# Patient Record
Sex: MALE | Race: WHITE | NOT HISPANIC OR LATINO | Employment: STUDENT | URBAN - METROPOLITAN AREA
[De-identification: names, ages, dates, MRNs, and addresses within clinical notes are randomized per-mention and may not be internally consistent; named-entity substitution may affect disease eponyms.]

---
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
## 2025-03-07 ENCOUNTER — OFFICE VISIT (OUTPATIENT)
Dept: URGENT CARE | Facility: CLINIC | Age: 20
End: 2025-03-07
Payer: OTHER GOVERNMENT

## 2025-03-07 VITALS
DIASTOLIC BLOOD PRESSURE: 74 MMHG | SYSTOLIC BLOOD PRESSURE: 110 MMHG | OXYGEN SATURATION: 98 % | RESPIRATION RATE: 17 BRPM | BODY MASS INDEX: 18.77 KG/M2 | WEIGHT: 138.56 LBS | HEART RATE: 117 BPM | TEMPERATURE: 99 F | HEIGHT: 72 IN

## 2025-03-07 DIAGNOSIS — R50.9 FEVER, UNSPECIFIED FEVER CAUSE: Primary | ICD-10-CM

## 2025-03-07 DIAGNOSIS — R05.1 ACUTE COUGH: ICD-10-CM

## 2025-03-07 DIAGNOSIS — J10.1 INFLUENZA A: ICD-10-CM

## 2025-03-07 DIAGNOSIS — R09.81 SINUS CONGESTION: ICD-10-CM

## 2025-03-07 LAB
CTP QC/QA: YES
POC MOLECULAR INFLUENZA A AGN: POSITIVE
POC MOLECULAR INFLUENZA B AGN: NEGATIVE

## 2025-03-07 RX ORDER — OSELTAMIVIR PHOSPHATE 75 MG/1
75 CAPSULE ORAL 2 TIMES DAILY
Qty: 10 CAPSULE | Refills: 0 | Status: SHIPPED | OUTPATIENT
Start: 2025-03-07 | End: 2025-03-12

## 2025-03-07 NOTE — PROGRESS NOTES
Patient ID: Jason Alvares is a 19 y.o. male.  Chief Complaint: Sore Throat    HPI:   Patient presents here today for above reason.     Patient is a 19 y.o. male who presents to urgent care with complaints of HA, Fever this morning, intermittent dry cough, sinus congestion, sore throat  x 1-2 day onset. (-) Alleviating factors. Patient denies chest congestion, SOB upon exertion, N/V/D.  Confirms positive flu exposure, therefore, requesting to be tested.     Past Medical History:  History reviewed. No pertinent past medical history.  History reviewed. No pertinent surgical history.  Review of patient's allergies indicates:   Allergen Reactions    Omnicef [cefdinir] Rash    Sulfa (sulfonamide antibiotics) Rash     Current Outpatient Medications   Medication Instructions    doxycycline (MONODOX) 100 mg, Oral, 2 times daily    ketoconazole (NIZORAL) 2 % shampoo Apply topically.    oseltamivir (TAMIFLU) 75 mg, Oral, 2 times daily     Social History[1]    ROS:   Review of Systems  12 point review of systems conducted, negative except as stated in the history of present illness. See HPI for details.   Vitals/PE:   Visit Vitals  /74 (Patient Position: Sitting)   Pulse (!) 117   Temp 98.8 °F (37.1 °C)   Resp 17   Ht 6' (1.829 m)   Wt 62.9 kg (138 lb 9 oz)   SpO2 98%   BMI 18.79 kg/m²     Physical Exam  Vitals and nursing note reviewed.   Constitutional:       Appearance: He is ill-appearing. He is not toxic-appearing or diaphoretic.   HENT:      Head: Normocephalic.      Nose: Mucosal edema and congestion present.      Right Turbinates: Enlarged and swollen.      Left Turbinates: Enlarged and swollen.      Right Sinus: Maxillary sinus tenderness and frontal sinus tenderness present.      Left Sinus: Maxillary sinus tenderness and frontal sinus tenderness present.      Mouth/Throat:      Pharynx: Posterior oropharyngeal erythema present.   Eyes:      Pupils: Pupils are equal, round, and reactive to light.    Cardiovascular:      Rate and Rhythm: Normal rate.      Pulses: Normal pulses.   Pulmonary:      Effort: Pulmonary effort is normal.   Abdominal:      General: Abdomen is flat.   Musculoskeletal:         General: Normal range of motion.   Skin:     General: Skin is warm.      Capillary Refill: Capillary refill takes less than 2 seconds.   Neurological:      General: No focal deficit present.      Mental Status: He is alert and oriented to person, place, and time.   Psychiatric:         Mood and Affect: Mood normal.         Behavior: Behavior normal.         Results for orders placed or performed in visit on 03/07/25   POCT Influenza A/B Molecular    Collection Time: 03/07/25  4:32 PM   Result Value Ref Range    POC Molecular Influenza A Ag Positive (A) Negative    POC Molecular Influenza B Ag Negative Negative     Acceptable Yes      Assessment/Plan:   Fever, unspecified fever cause  -     POCT Influenza A/B Molecular  Positive  Acute cough  Okay to utilize OTC cough and cold medications as needed.  Sinus congestion  As above  Influenza A  -     oseltamivir (TAMIFLU) 75 MG capsule; Take 1 capsule (75 mg total) by mouth 2 (two) times daily. for 5 days  Dispense: 10 capsule; Refill: 0  See educational handouts pertaining to influenza infections.     Orders Placed This Encounter   Procedures    POCT Influenza A/B Molecular       Education and counseling done face to face regarding medical conditions and plan. Contact office if new symptoms develop. Should any symptoms ever significantly worsen seek emergency medical attention/go to ER. Follow up at least yearly for wellness or sooner PRN. Nurse to call patient with any results. The patient is receptive, expresses understanding and is agreeable to plan. All questions have been answered.             [1]   Social History  Socioeconomic History    Marital status: Single   Tobacco Use    Smoking status: Never    Smokeless tobacco: Never   Substance and  Sexual Activity    Alcohol use: No    Drug use: No    Sexual activity: Never   Social History Narrative    Lives at home with both parents, no smoking, no pets

## 2025-03-07 NOTE — PATIENT INSTRUCTIONS
Assessment/Plan:   Fever, unspecified fever cause  -     POCT Influenza A/B Molecular  Positive  Acute cough  Okay to utilize OTC cough and cold medications as needed.  Sinus congestion  As above  Influenza A  -     oseltamivir (TAMIFLU) 75 MG capsule; Take 1 capsule (75 mg total) by mouth 2 (two) times daily. for 5 days  Dispense: 10 capsule; Refill: 0  See educational handouts pertaining to influenza infections.     Orders Placed This Encounter   Procedures    POCT Influenza A/B Molecular       Education and counseling done face to face regarding medical conditions and plan. Contact office if new symptoms develop. Should any symptoms ever significantly worsen seek emergency medical attention/go to ER

## 2025-08-19 ENCOUNTER — PATIENT MESSAGE (OUTPATIENT)
Dept: ADMINISTRATIVE | Facility: HOSPITAL | Age: 20
End: 2025-08-19
Payer: OTHER GOVERNMENT